# Patient Record
Sex: FEMALE | Race: WHITE
[De-identification: names, ages, dates, MRNs, and addresses within clinical notes are randomized per-mention and may not be internally consistent; named-entity substitution may affect disease eponyms.]

---

## 2019-02-19 ENCOUNTER — HOSPITAL ENCOUNTER (EMERGENCY)
Dept: HOSPITAL 14 - H.ER | Age: 69
Discharge: HOME | End: 2019-02-19
Payer: MEDICARE

## 2019-02-19 VITALS
TEMPERATURE: 98.1 F | HEART RATE: 75 BPM | DIASTOLIC BLOOD PRESSURE: 80 MMHG | SYSTOLIC BLOOD PRESSURE: 134 MMHG | OXYGEN SATURATION: 98 %

## 2019-02-19 VITALS — RESPIRATION RATE: 18 BRPM

## 2019-02-19 VITALS — BODY MASS INDEX: 23.4 KG/M2

## 2019-02-19 DIAGNOSIS — M51.36: Primary | ICD-10-CM

## 2019-02-19 DIAGNOSIS — M06.9: ICD-10-CM

## 2019-02-19 DIAGNOSIS — J43.9: ICD-10-CM

## 2019-02-19 LAB
ALBUMIN SERPL-MCNC: 5 G/DL (ref 3.5–5)
ALBUMIN/GLOB SERPL: 1.2 {RATIO} (ref 1–2.1)
ALT SERPL-CCNC: 23 U/L (ref 9–52)
AST SERPL-CCNC: 83 U/L (ref 14–36)
BASOPHILS # BLD AUTO: 0.1 K/UL (ref 0–0.2)
BASOPHILS NFR BLD: 1.1 % (ref 0–2)
BUN SERPL-MCNC: 21 MG/DL (ref 7–17)
CALCIUM SERPL-MCNC: 10.1 MG/DL (ref 8.4–10.2)
EOSINOPHIL # BLD AUTO: 0.1 K/UL (ref 0–0.7)
EOSINOPHIL NFR BLD: 1.5 % (ref 0–4)
ERYTHROCYTE [DISTWIDTH] IN BLOOD BY AUTOMATED COUNT: 12.3 % (ref 11.5–14.5)
GFR NON-AFRICAN AMERICAN: > 60
HGB BLD-MCNC: 12.7 G/DL (ref 12–16)
LYMPHOCYTES # BLD AUTO: 1.9 K/UL (ref 1–4.3)
LYMPHOCYTES NFR BLD AUTO: 31.2 % (ref 20–40)
MCH RBC QN AUTO: 31.6 PG (ref 27–31)
MCHC RBC AUTO-ENTMCNC: 33.3 G/DL (ref 33–37)
MCV RBC AUTO: 95.1 FL (ref 81–99)
MONOCYTES # BLD: 0.6 K/UL (ref 0–0.8)
MONOCYTES NFR BLD: 9.1 % (ref 0–10)
NEUTROPHILS # BLD: 3.5 K/UL (ref 1.8–7)
NEUTROPHILS NFR BLD AUTO: 57.1 % (ref 50–75)
NRBC BLD AUTO-RTO: 0.1 % (ref 0–0)
PLATELET # BLD: 360 K/UL (ref 130–400)
PMV BLD AUTO: 8.6 FL (ref 7.2–11.7)
RBC # BLD AUTO: 4 MIL/UL (ref 3.8–5.2)
WBC # BLD AUTO: 6.2 K/UL (ref 4.8–10.8)

## 2019-02-19 NOTE — ED PDOC
HPI: Back


Time Seen by Provider: 02/19/19 09:40


Chief Complaint (Nursing): Back Pain


Chief Complaint (Provider): Back pain


History Per: Patient


History/Exam Limitations: no limitations


Onset/Duration Of Symptoms: Days (2 weeks)


Current Symptoms Are (Timing): Still Present


Quality Of Discomfort: "Pain"


Additional Complaint(s): 


69 year old female presents to the ED for an evaluation of right side lower back

pain onset for 2 weeks that is non-radiating. Patient has a past medical history

of osteoporosis and rheumatoid arthritis. Otherwise, patient denies history of 

trauma, urinary symptoms, fever, weakness or paresthesia.  





PMD: Daniel Funez 











Past Medical History


Reviewed: Historical Data, Nursing Documentation, Vital Signs


Vital Signs: 


                                Last Vital Signs











Temp  98.5 F   02/19/19 09:22


 


Pulse  71   02/19/19 09:22


 


Resp  18   02/19/19 09:22


 


BP  123/59 L  02/19/19 09:22


 


Pulse Ox  99   02/19/19 09:22














- Medical History


PMH: Anemia (DURING PREGNANCY), Asthma, Bronchitis, COPD, Emphysema, 

Osteoporosis





- Surgical History


Surgical History: Tonsillectomy





- Family History


Family History: States: Unknown Family Hx





- Social History


Current smoker - smoking cessation education provided: No


Alcohol: None


Drugs: Denies





- Home Medications


Home Medications: 


                                Ambulatory Orders











 Medication  Instructions  Recorded


 


Albuterol Sulfate [Proventil Hfa] 2 puff IH Q4 PRN 01/07/15


 


Alendronate [Fosamax] 70 mg PO QWK 01/07/15


 


Atorvastatin [Lipitor] 20 mg PO BID 01/07/15


 


Azelastine HCl [Astepro] 1 spray NS BID PRN 01/07/15


 


Benzonatate 100 mg PO BID 01/07/15


 


Budesonide/Formoterol Fumarate 2 puff IH BID PRN 01/07/15





[Symbicort]  


 


Bupropion HCl [Bupropion HCl Xl] 150 mg PO DAILY 01/07/15


 


Calcium Carbonate [Calcium 600] 1 tab PO DAILY 01/07/15


 


Loratadine 10 mg PO DAILY 01/07/15


 


Meclizine [Meclizine*] 25 mg PO Q6 PRN 01/07/15


 


Memantine [Namenda] 10 mg PO BID 01/07/15


 


Rosuvastatin Calcium [Crestor] 10 mg PO DAILY 01/07/15


 


Tiotropium Bromide Inhaler 1 inhaler INH BID 01/07/15





[Spiriva Inhalation Handihaler  





Device]  


 


traMADol [Ultram] 50 mg PO DAILY PRN 01/07/15


 


traMADol [Ultram] 50 mg PO Q8 #10 tab 02/19/19














- Allergies


Allergies/Adverse Reactions: 


                                    Allergies











Allergy/AdvReac Type Severity Reaction Status Date / Time


 


No Known Allergies Allergy   Verified 01/07/15 09:21














Review of Systems


ROS Statement: Except As Marked, All Systems Reviewed And Found Negative


Constitutional: Negative for: Fever


Genitourinary Female: Negative for: Dysuria, Frequency, Incontinence, Hematuria


Musculoskeletal: Positive for: Back Pain


Neurological: Negative for: Weakness





Physical Exam





- Reviewed


Nursing Documentation Reviewed: Yes


Vital Signs Reviewed: Yes





- Physical Exam


Appears: Positive for: Non-toxic, No Acute Distress


Gastrointestinal/Abdominal: Positive for: Normal Exam, Soft.  Negative for: 

Tenderness


Back: Positive for: Other (no midline tenderenss ).  Negative for: L CVA 

Tenderness, R CVA Tenderness


Extremity: Positive for: Normal ROM.  Negative for: Tenderness, Pedal Edema, 

Deformity


Neurologic/Psych: Positive for: Alert, Oriented (x3).  Negative for: Motor/Senso

ry Deficits





- Laboratory Results


Result Diagrams: 


                                 02/19/19 10:32





                                 02/19/19 10:32





- ECG


O2 Sat by Pulse Oximetry: 99 (RA)


Pulse Ox Interpretation: Normal





Medical Decision Making


Medical Decision Making: 


Time: 0945


Impression: back pain most likely from osteoporosis. Will check urine for kidney

 stone or infection


Plan:


--CMP


--ED urine dipstick


--CBC w/ differential 


--LS Spine AP/LAT [RAD]





1057


PROCEDURE:  Radiographs of the Lumbar Spine.


HISTORY:


back pain, osteoporosis


COMPARISON:


No prior.


FINDINGS:


BONES:


Minor chronic appearing anterior wedge deformity L1 segment.  Remaining 

vertebral bodies otherwise exhibit relatively normal stature.  Vertebral bodies 

and facets normally aligned.


DISC SPACES:


Multilevel degenerative spondylosis.  Changes include varying degrees of disc 

space narrowing more so along the posterior disc margins with endplate 

eburnation and small anterolateral osteophyte formation.  The facets also 

hypertrophic L5-S1 through the L2-L3 levels in decreasing order of severity


OTHER FINDINGS:


None.


IMPRESSION:


No acute fractures however there appears to be a chronic anterior wedge 

deformity L1 segment.


Multilevel degenerative spondylosis as above.





----------------------------------------------------------------------

--------------------------- 


Scribe Attestation:


Documented by Ave Bustillos, acting as a scribe for Pradeep Watkins MD.





Provider Scribe Attestation:


All medical record entries made by the Scribe were at my direction and 

personally dictated by me. I have reviewed the chart and agree that the record 

accurately reflects my personal performance of the history, physical exam, 

medical decision making, and the department course for this patient. I have also

 personally directed, reviewed, and agree with the discharge instructions and 

disposition.











Disposition





- Clinical Impression


Clinical Impression: 


 Degenerative disc disease, lumbar








- Patient ED Disposition


Is Patient to be Admitted: No


Counseled Patient/Family Regarding: Studies Performed, Diagnosis, Need For 

Followup, Rx Given





- Disposition


Referrals: 


Daniel Funez MD [Staff Provider] - 


Disposition: Routine/Home


Disposition Time: 12:05


Condition: FAIR


Prescriptions: 


traMADol [Ultram] 50 mg PO Q8 #10 tab


Instructions:  Degenerative Disc Disease


Forms:  [employer] (English)


Print Language: Swedish

## 2019-02-19 NOTE — RAD
Date of service: 



02/19/2019



PROCEDURE:  Radiographs of the Lumbar Spine.



HISTORY:

back pain, osteoporosis







COMPARISON:

No prior.



FINDINGS:



BONES:

Minor chronic appearing anterior wedge deformity L1 segment.  

Remaining vertebral bodies otherwise exhibit relatively normal 

stature.  Vertebral bodies and facets normally aligned.



DISC SPACES:

Multilevel degenerative spondylosis.  Changes include varying degrees 

of disc space narrowing more so along the posterior disc margins with 

endplate eburnation and small anterolateral osteophyte formation.  

The facets also hypertrophic L5-S1 through the L2-L3 levels in 

decreasing order of severity



OTHER FINDINGS:

None.



IMPRESSION:

No acute fractures however there appears to be a chronic anterior 

wedge deformity L1 segment.



Multilevel degenerative spondylosis as above.